# Patient Record
Sex: MALE | Race: WHITE
[De-identification: names, ages, dates, MRNs, and addresses within clinical notes are randomized per-mention and may not be internally consistent; named-entity substitution may affect disease eponyms.]

---

## 2020-01-14 ENCOUNTER — HOSPITAL ENCOUNTER (EMERGENCY)
Dept: HOSPITAL 62 - ER | Age: 40
LOS: 1 days | Discharge: HOME | End: 2020-01-15
Payer: SELF-PAY

## 2020-01-14 DIAGNOSIS — L03.211: Primary | ICD-10-CM

## 2020-01-14 DIAGNOSIS — F12.10: ICD-10-CM

## 2020-01-14 DIAGNOSIS — R51: ICD-10-CM

## 2020-01-14 DIAGNOSIS — R68.83: ICD-10-CM

## 2020-01-14 DIAGNOSIS — F17.210: ICD-10-CM

## 2020-01-14 LAB
ADD MANUAL DIFF: NO
ALBUMIN SERPL-MCNC: 4.2 G/DL (ref 3.5–5)
ALP SERPL-CCNC: 94 U/L (ref 38–126)
ANION GAP SERPL CALC-SCNC: 10 MMOL/L (ref 5–19)
AST SERPL-CCNC: 93 U/L (ref 17–59)
BASOPHILS # BLD AUTO: 0.1 10^3/UL (ref 0–0.2)
BASOPHILS NFR BLD AUTO: 1.1 % (ref 0–2)
BILIRUB DIRECT SERPL-MCNC: 0.4 MG/DL (ref 0–0.4)
BILIRUB SERPL-MCNC: 0.8 MG/DL (ref 0.2–1.3)
BUN SERPL-MCNC: 6 MG/DL (ref 7–20)
CALCIUM: 9.3 MG/DL (ref 8.4–10.2)
CHLORIDE SERPL-SCNC: 95 MMOL/L (ref 98–107)
CO2 SERPL-SCNC: 31 MMOL/L (ref 22–30)
EOSINOPHIL # BLD AUTO: 0.2 10^3/UL (ref 0–0.6)
EOSINOPHIL NFR BLD AUTO: 3.4 % (ref 0–6)
ERYTHROCYTE [DISTWIDTH] IN BLOOD BY AUTOMATED COUNT: 14.1 % (ref 11.5–14)
GLUCOSE SERPL-MCNC: 102 MG/DL (ref 75–110)
HCT VFR BLD CALC: 45.3 % (ref 37.9–51)
HGB BLD-MCNC: 15.8 G/DL (ref 13.5–17)
LYMPHOCYTES # BLD AUTO: 1.3 10^3/UL (ref 0.5–4.7)
LYMPHOCYTES NFR BLD AUTO: 19.5 % (ref 13–45)
MCH RBC QN AUTO: 37.4 PG (ref 27–33.4)
MCHC RBC AUTO-ENTMCNC: 35 G/DL (ref 32–36)
MCV RBC AUTO: 107 FL (ref 80–97)
MONOCYTES # BLD AUTO: 0.6 10^3/UL (ref 0.1–1.4)
MONOCYTES NFR BLD AUTO: 8.6 % (ref 3–13)
NEUTROPHILS # BLD AUTO: 4.6 10^3/UL (ref 1.7–8.2)
NEUTS SEG NFR BLD AUTO: 67.4 % (ref 42–78)
PLATELET # BLD: 174 10^3/UL (ref 150–450)
POTASSIUM SERPL-SCNC: 4.2 MMOL/L (ref 3.6–5)
PROT SERPL-MCNC: 7.5 G/DL (ref 6.3–8.2)
RBC # BLD AUTO: 4.24 10^6/UL (ref 4.35–5.55)
TOTAL CELLS COUNTED % (AUTO): 100 %
WBC # BLD AUTO: 6.9 10^3/UL (ref 4–10.5)

## 2020-01-14 PROCEDURE — 36415 COLL VENOUS BLD VENIPUNCTURE: CPT

## 2020-01-14 PROCEDURE — 96365 THER/PROPH/DIAG IV INF INIT: CPT

## 2020-01-14 PROCEDURE — 99283 EMERGENCY DEPT VISIT LOW MDM: CPT

## 2020-01-14 PROCEDURE — 70487 CT MAXILLOFACIAL W/DYE: CPT

## 2020-01-14 PROCEDURE — 80053 COMPREHEN METABOLIC PANEL: CPT

## 2020-01-14 PROCEDURE — 85025 COMPLETE CBC W/AUTO DIFF WBC: CPT

## 2020-01-14 PROCEDURE — 96375 TX/PRO/DX INJ NEW DRUG ADDON: CPT

## 2020-01-14 NOTE — ER DOCUMENT REPORT
ED Medical Screen (RME)





- General


Chief Complaint: Abscess


Stated Complaint: LEFT FACIAL SWELLING


Time Seen by Provider: 01/14/20 21:08


Primary Care Provider: 


DOLLY MONTALVO MD [Primary Care Provider] - Follow up as needed


Notes: 





Patient is a 39-year-old male who presents emergency department with a chief 

complaint of left facial swelling.  Patient reports this developed 2 days ago.  

Patient reports there has been a small amount of drainage coming from the site 

but that throughout the past 24 hours the swelling to his left cheek is 

significantly gotten worse.  Patient reports chills without fever.


TRAVEL OUTSIDE OF THE [place] IN LAST 30 DAYS: No





- Related Data


Allergies/Adverse Reactions: 


                                        





No Known Allergies Allergy (Verified 01/14/20 21:01)


   











Past Medical History


Psychiatric Medical History: Reports: Hx Anxiety, Hx Depression





- Immunizations


Immunizations up to date: Yes


Hx Diphtheria, Pertussis, Tetanus Vaccination: Yes





Physical Exam





- Vital signs


Vitals: 





                                        











Temp Pulse Resp BP Pulse Ox


 


 97.7 F   114 H  20   139/103 H  97 


 


 01/14/20 20:54  01/14/20 20:54  01/14/20 20:54  01/14/20 20:54  01/14/20 20:54














Course





- Re-evaluation


Re-evalutation: 





01/14/20 21:20


Patient does have significant left facial swelling.  There does appear to be a 

scabbed over area that is not actively draining.  Patient slightly tachycardic 

with a heart rate of 114.  Will initiate IV, basic lab work and IV antibiotics.





I have greeted and performed a rapid initial assessment of this patient.  A 

comprehensive ED assessment and evaluation of the patient, analysis of test 

results and completion of the medical decision making process will be conducted 

by additional ED providers.





- Vital Signs


Vital signs: 





                                        











Temp Pulse Resp BP Pulse Ox


 


 97.7 F   114 H  20   139/103 H  97 


 


 01/14/20 20:54  01/14/20 20:54  01/14/20 20:54  01/14/20 20:54  01/14/20 20:54














Doctor's Discharge





- Discharge


Referrals: 


DOLLY MONTALVO MD [Primary Care Provider] - Follow up as needed

## 2020-01-14 NOTE — RADIOLOGY REPORT (SQ)
CT face with contrast on 1/14/2020 at 10:19 PM



CLINICAL INDICATION: Possible insect bite to left cheek, left

facial swelling



TECHNIQUE: Multiple axial images are obtained throughout the face

following the administration of IV contrast. Sagittal and coronal

reformatted images are also performed and reviewed. This exam was

performed according to our departmental dose-optimization

program, which includes automated exposure control, adjustment of

the mA and/or kV according to patient size and/or use of

iterative reconstruction technique. 

Total DLP is 604.14 mGy*cm.



COMPARISON: None



FINDINGS: There is left facial soft tissue swelling with

subcutaneous fat stranding in the left cheek extending into the

left mandibular region consistent with a left facial cellulitis.

No fluid collection to suggest abscess is noted. There is no

evidence of post septal involvement of the left eye. There is

minimal mucosal thickening in the left greater than right

maxillary sinuses with also partial opacification of the left

ethmoid and left frontal sinus consistent with mild chronic

sinusitis. No periapical lucencies to suggest a periapical

abscess is noted. There are no acute fracture lines. No other

bony or soft tissue abnormality is noted.



IMPRESSION: Left facial cellulitis without fluid collection to

suggest abscess.

## 2020-01-15 VITALS — SYSTOLIC BLOOD PRESSURE: 140 MMHG | DIASTOLIC BLOOD PRESSURE: 90 MMHG

## 2020-01-15 NOTE — ER DOCUMENT REPORT
ED Head/Face/Scalp Injury





- General


Chief Complaint: Abscess


Stated Complaint: LEFT FACIAL SWELLING


Time Seen by Provider: 01/14/20 21:08


Primary Care Provider: 


DOLLY MONTALVO MD [ACTIVE STAFF] - Follow up as needed


Mode of Arrival: Ambulatory


Information source: Patient


Notes: 





39-year-old male presented to ED for complaint of left-sided facial swelling 

redness and pain for the last 2 days.  He states he had a small abscess area to 

the left side of his face that he popped and got some minimal drainage out of 

but it is current continue to swell and be painful.  He states he has had some 

chills but has not had any fevers.  He is alert oriented respirations regular 

nonlabored speaking in full sentences.  He states he does smoke a pack a day 

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  There was a CT 

completed before I saw the patient.  The CT was negative for an abscess but 

positive for a cellulitis.  Patient had already been treated with pain medicine 

and with IV clindamycin.


TRAVEL OUTSIDE OF THE [place] IN LAST 30 DAYS: No





- HPI


Patient complains to provider of: Pain, Swelling


Injury to: Cheek - Left


Location of problem: Cheek - Left


Occurred: Other - 2 days


Timing: Still present


Loss consciousness: No loss of consciousness





- Related Data


Allergies/Adverse Reactions: 


                                        





No Known Allergies Allergy (Verified 01/14/20 21:01)


   











Past Medical History





- General


Information source: Patient





- Social History


Smoking Status: Current Every Day Smoker


Cigarette use (# per day): Yes - Pack per day


Smoking Education Provided: Yes - Minutes


Frequency of alcohol use: Heavy


Drug Abuse: Marijuana


Occupation: Pacheco


Lives with: Alone


Family History: Reviewed & Not Pertinent


Patient has suicidal ideation: No


Patient has homicidal ideation: No





- Past Medical History


Cardiac Medical History: Reports: None


Pulmonary Medical History: Reports: None


EENT Medical History: Reports: None


Neurological Medical History: Reports: None


Endocrine Medical History: Reports: None


Renal/ Medical History: Reports: None


Malignancy Medical History: Reports None


GI Medical History: Reports: None


Musculoskeletal Medical History: Reports None


Skin Medical History: Reports None


Psychiatric Medical History: Reports: Hx Anxiety, Hx Depression


Traumatic Medical History: Reports: None


Infectious Medical History: Reports: None


Surgical Hx: Negative


Past Surgical History: Reports: None





- Immunizations


Immunizations up to date: Yes


Hx Diphtheria, Pertussis, Tetanus Vaccination: Yes





Review of Systems





- Review of Systems


Constitutional: No symptoms reported


EENT: Other - Left facial swelling redness and pain


Cardiovascular: No symptoms reported


Respiratory: No symptoms reported


Gastrointestinal: No symptoms reported


Genitourinary: No symptoms reported


Male Genitourinary: No symptoms reported


Musculoskeletal: No symptoms reported


Skin: Other - Redness swelling and pain left side of face


Hematologic/Lymphatic: No symptoms reported


Neurological/Psychological: No symptoms reported





Physical Exam





- Vital signs


Vitals: 


                                        











Temp Pulse Resp BP Pulse Ox


 


 97.7 F   114 H  20   139/103 H  97 


 


 01/14/20 20:54  01/14/20 20:54  01/14/20 20:54  01/14/20 20:54  01/14/20 20:54











Interpretation: Normal





- General


General appearance: Appears well, Alert





- HEENT


Head: Normocephalic, Atraumatic


Eyes: Normal


Pupils: PERRL


Ears: Normal


External canal: Normal


Tympanic membrane: Normal


Nasal: Normal


Mouth/Lips: Normal


Mucous membranes: Normal


Pharynx: Normal


Neck: Anterior cervical chain





- Respiratory


Respiratory status: No respiratory distress


Chest status: Nontender


Breath sounds: Normal


Chest palpation: Normal





- Cardiovascular


Rhythm: Regular


Heart sounds: Normal auscultation


Murmur: No





- Abdominal


Inspection: Normal


Distension: No distension


Bowel sounds: Normal


Tenderness: Nontender


Organomegaly: No organomegaly





- Back


Back: Normal, Nontender





- Extremities


General upper extremity: Normal inspection, Nontender, Normal color, Normal ROM,

Normal temperature


General lower extremity: Normal inspection, Nontender, Normal color, Normal ROM,

Normal temperature, Normal weight bearing.  No: Aubrey's sign





- Neurological


Neuro grossly intact: Yes


Cognition: Normal


Orientation: AAOx4


Jenn Coma Scale Eye Opening: Spontaneous


[place]enn Coma Scale Verbal: Oriented


[place] Coma Scale Motor: Obeys Commands


[place] Coma Scale Total: 15


Speech: Normal


Motor strength normal: LUE, RUE, LLE, RLE


Sensory: Normal





- Psychological


Associated symptoms: Normal affect, Normal mood





- Skin


Skin Temperature: Warm


Skin Moisture: Dry


Skin Color: Normal


Location of irregularity: Face - Left cheek


Character of irregularity: Erythematous


Irregularity with: Swelling, Tenderness, Warmth, Inflammation





Course





- Re-evaluation


Re-evalutation: 





01/15/20 08:51


Labs and CT report discussed with patient and written report of labs and CT 

given to patient.  Patient was discharged home with prescription for clindamycin

and instructed to follow-up with his primary care doctor patient verbalized 

understanding and agreement with treatment plan





- Vital Signs


Vital signs: 


                                        











Temp Pulse Resp BP Pulse Ox


 


 98.1 F   94   17   140/90 H  96 


 


 01/15/20 00:26  01/15/20 00:26  01/15/20 00:26  01/15/20 00:26  01/15/20 00:26














- Laboratory


Result Diagrams: 


                                 01/14/20 21:34





                                 01/14/20 21:34


Laboratory results interpreted by me: 


                                        











  01/14/20 01/14/20





  21:34 21:34


 


RBC  4.24 L 


 


MCV  107 H 


 


MCH  37.4 H 


 


RDW  14.1 H 


 


Sodium   136.0 L


 


Chloride   95 L


 


Carbon Dioxide   31 H


 


BUN   6 L


 


AST   93 H














- Diagnostic Test


Radiology reviewed: Image reviewed, Reports reviewed





Discharge





- Discharge


Clinical Impression: 


 Facial cellulitis





Condition: Stable


Disposition: HOME, SELF-CARE


Additional Instructions: 


CELLULITIS:





     You have an infection of your skin and underlying soft tissues called 

cellulitis.  This is due to bacteria, which can enter through any break in the 

skin, or even through an irritated hair follicle.  Untreated, cellulitis will u

sually worsen.


     Antibiotics are required.  Usually, warm packs or warm soaks, and elevation

of the infected area are recommended.  You should start getting better within 24

to 36 hours.


     Most infections respond quickly to the right medication. Follow-up care is 

important, however, to check for abscess (boil) formation, unsuspected foreign 

body, or resistant infection.


     If you develop fever, chills, or if the area of infection is becoming 

rapidly more swollen or painful, call the doctor at once.








CLINDAMYCIN:


     You have been given a prescription for the antibiotic clindamycin.  It is 

often prescribed for infections in the mouth, such as dental infections or 

abscesses, and for skin infections due to MRSA.  It's important that you take 

all the medication, unless instructed otherwise by your physician.  Failure to 

complete the entire course can result in relapse of your condition.


     Common side effects of antibiotics include nausea, intestinal cramping, or 

diarrhea.  Women may develop vaginal yeast infections, and babies can get yeast 

(thrush) in the mouth following the use of antibiotics.  Contact your physician 

if you develop significant side effects from this medication.


     Allergy to this antibiotic can result in hives, wheezing, faintness, or 

itching.  If symptoms of allergy occur, stop the medication and call the doctor.





Epsom Salt Soaks





     Soak the wound area in a container of warm epsom salt water.  If you can't 

get the wound area into a bucket or pan, use a folded towel soaked in the epsom 

salt solution and apply to the area.


     Use clean hot tap water (about the temperature of a very warm bath), mixing

in about one (1) teaspoon for every pint of water.


           Two gallon --> 16 teaspoons Epsom Salts


           One gallon -->  8 teaspoons Epsom Salts


           Two quarts -->  4 teaspoons Epsom Salts


           One quart  -->  2 teaspoons Epsom Salts


     Soak the wound for about 20 minutes while gently moving it around in the 

water.  Repeat this four (4) times a day.





Acetaminophen





     Acetaminophen may be taken for pain relief or fever control. It's much 

safer than aspirin, offering a wider range of "safe" dosages.  It is safe during

pregnancy.  Some brand names are Tylenol, Panadol, Datril, Anacin 3, Tempra, and

Liquiprin. Acetaminophen can be repeated every four hours.  The following are 

maximum recommended dosages:





WEIGHT         Dose             Drops                  Elixir        

Chewable(80mg)


(LBS.)                            drprs=droppers    tsp=teaspoon


6                 40 mg            .4 ml (1/2)


6-11            80 mg            .8 ml (full)            1/2   tsp           1  

    tab


12-16         120 mg           1 1/2 drprs            3/4   tsp           1 1/2 

tabs


17-23         160 mg             2  drprs              1      tsp           2   

   tabs


24-30         240 mg             3  drprs              1 1/2 tsp           3    

  tabs


30-35         320 mg                                     2       tsp           4

      tabs


36-41         360 mg                                     2 1/4 tsp           4 

1/2  tabs


42-47         400 mg                                     2 1/2 tsp           5  

    tabs


48-53         480 mg                                     3       tsp          6 

     tabs


54-59         520 mg                                     3  1/4 tsp          6 

1/2 tabs


60-64         560 mg                                     3  1/2 tsp          7  

   tabs 


65-70         600 mg                                     3  3/4 tsp          7 

1/2 tabs


71-76         640 mg                                     4       tsp           8

     tabs


77-82         720 mg                                     4 1/2  tsp           9 

    tabs


83-88         800 mg                                     5       tsp           

10     tabs





>89 pounds or adults          650 mg to 900 mg 





Acetaminophen can be repeated every four hours. Maximum daily dose not to exceed

4000 mg.





   These maximum recommended dosages are slightly higher than the dosages 

written on the product container, but these dosages are very safe and well below

the toxic dosage for acetaminophen.








Ibuprofen





     Ibuprofen is an excellent, safe drug for pain control.  In addition, it has

potent antiinflammatory effects which are beneficial, especially in the 

treatment of injuries, arthritis, or tendonitis. It's best to take ibuprofen 

with food.  Persons with ulcer disease or allergy to aspirin should notify their

physician of this before taking ibuprofen.


     Take the medication exactly as prescribed.  Don't take additional doses 

unless instructed to do so by your doctor.  If you develop wheezing, shortness 

of breath, hives, faintness, stomach pain, vomiting, or dark black stools, 

return for re-evaluation at once.





FOLLOW-UP CARE:


If you have been referred to a physician for follow-up care, call the [facility] office for an appointment as you were instructed or within the next two 

days.  If you experience worsening or a significant change in your symptoms, 

notify the physician immediately or return to the Emergency Department at any 

time for re-evaluation.


Prescriptions: 


Clindamycin HCl 300 mg PO Q6 #28 capsule


Forms:  Smoking Cessation Education, Elevated Blood Pressure


Referrals: 


DOLLY MONTALVO MD [ACTIVE STAFF] - Follow up as needed